# Patient Record
Sex: MALE | Race: WHITE | Employment: OTHER | URBAN - METROPOLITAN AREA
[De-identification: names, ages, dates, MRNs, and addresses within clinical notes are randomized per-mention and may not be internally consistent; named-entity substitution may affect disease eponyms.]

---

## 2023-01-18 ENCOUNTER — HOSPITAL ENCOUNTER (EMERGENCY)
Age: 43
Discharge: HOME | End: 2023-01-18
Payer: COMMERCIAL

## 2023-01-18 VITALS
RESPIRATION RATE: 20 BRPM | SYSTOLIC BLOOD PRESSURE: 150 MMHG | HEART RATE: 95 BPM | OXYGEN SATURATION: 100 % | WEIGHT: 255 LBS | DIASTOLIC BLOOD PRESSURE: 108 MMHG | TEMPERATURE: 98.1 F

## 2023-01-18 DIAGNOSIS — M10.9 ACUTE GOUT OF LEFT HAND, UNSPECIFIED CAUSE: Primary | ICD-10-CM

## 2023-01-18 PROCEDURE — 96372 THER/PROPH/DIAG INJ SC/IM: CPT

## 2023-01-18 PROCEDURE — 6360000002 HC RX W HCPCS: Performed by: NURSE PRACTITIONER

## 2023-01-18 PROCEDURE — 99211 OFF/OP EST MAY X REQ PHY/QHP: CPT

## 2023-01-18 RX ORDER — KETOROLAC TROMETHAMINE 30 MG/ML
30 INJECTION, SOLUTION INTRAMUSCULAR; INTRAVENOUS ONCE
Status: COMPLETED | OUTPATIENT
Start: 2023-01-18 | End: 2023-01-18

## 2023-01-18 RX ORDER — DEXAMETHASONE SODIUM PHOSPHATE 10 MG/ML
10 INJECTION, SOLUTION INTRAMUSCULAR; INTRAVENOUS ONCE
Status: COMPLETED | OUTPATIENT
Start: 2023-01-18 | End: 2023-01-18

## 2023-01-18 RX ORDER — INDOMETHACIN 50 MG/1
50 CAPSULE ORAL
Qty: 20 CAPSULE | Refills: 0 | Status: SHIPPED | OUTPATIENT
Start: 2023-01-18

## 2023-01-18 RX ADMIN — DEXAMETHASONE SODIUM PHOSPHATE 10 MG: 10 INJECTION, SOLUTION INTRAMUSCULAR; INTRAVENOUS at 13:27

## 2023-01-18 RX ADMIN — KETOROLAC TROMETHAMINE 30 MG: 30 INJECTION, SOLUTION INTRAMUSCULAR; INTRAVENOUS at 13:26

## 2023-01-18 ASSESSMENT — PAIN SCALES - GENERAL
PAINLEVEL_OUTOF10: 10
PAINLEVEL_OUTOF10: 10

## 2023-01-18 ASSESSMENT — PAIN - FUNCTIONAL ASSESSMENT: PAIN_FUNCTIONAL_ASSESSMENT: 0-10

## 2023-01-18 NOTE — ED PROVIDER NOTES
[address] ENCOUNTER        Pt Name: Enid Haddad  MRN: [id number]  [place] 1980  Date of evaluation: 1/18/2023  Provider: EVENS Cabrera CNP  PCP: No primary care provider on file. Note Started: 1:19 PM EST 1/18/23    CHIEF COMPLAINT       Chief Complaint   Patient presents with    Gout     Left hand, and foot, started Sunday night       HISTORY OF PRESENT ILLNESS: 1 or more Elements   History From: patient    Limitations to history : None    Enid Haddad is a 43 y.o. male who presents with complaints of gout. He said he has had it before. It is in his left hand and his left foot. It started 4 days ago. He said it is very painful. He said that he has had gout before in his hand and its the same thing that he always gets. He said he is in a lot of pain he said he made his appointment with a rheumatologist but is not until February so he presents here for evaluation. Nursing Notes were all reviewed and agreed with or any disagreements were addressed in the HPI. REVIEW OF SYSTEMS :      Review of Systems    Positives and Pertinent negatives as per HPI. SURGICAL HISTORY     Past Surgical History:   Procedure Laterality Date    ELBOW SURGERY      JOINT REPLACEMENT      WISDOM TOOTH EXTRACTION         CURRENTMEDICATIONS       Previous Medications    No medications on file       ALLERGIES     Patient has no known allergies. FAMILYHISTORY     History reviewed. No pertinent family history.      SOCIAL HISTORY       Social History     Tobacco Use    Smoking status: Never    Smokeless tobacco: Never   Substance Use Topics    Drug use: Never       SCREENINGS                                    PHYSICAL EXAM  1 or more Elements     ED Triage Vitals   BP Temp Temp src Heart Rate Resp SpO2 Height Weight   01/18/23 1233 01/18/23 1233 -- 01/18/23 1233 01/18/23 1233 01/18/23 1233 -- 01/18/23 1231   (!) 150/108 98.1 °F (36.7 °C)  (!) 105 22 100 % 255 lb (115.7 kg)       Physical Exam        Constitutional/General: Alert and oriented x3,   Head: Normocephalic and atraumatic  Eyes:  conjunctiva normal, sclera non icteric  ENT:  , handling secretions  Neck: Supple, full ROM,   Respiratory: . Not in respiratory distress  Cardiovascular:  Regular rate. Musculoskeletal: Moves all extremities x 4. Did look at the left foot where he said he has gout there is no redness and there is no swelling but he does have a tender area on the dorsum of the midfoot. He said that his \"tophi gout \"the left hand is erythematous the index finger and the fifth finger along with some swelling of the hand. He is very tender to touch. Integument: skin warm and dry. No rashes. Neurologic: GCS 15, no focal deficits, symmetric strength 5/5 in the upper and lower extremities bilaterally  Psychiatric: Normal Affect            DIAGNOSTIC RESULTS   LABS:    Labs Reviewed - No data to display    As interpreted by me, the above displayed labs are abnormal. All other labs obtained during this visit were within normal range or not returned as of this dictation. Interpretation per the Radiologist below, if available at the time of this note:    No orders to display     No results found. No results found. PROCEDURES   Unless otherwise noted below, none     Procedures      PAST MEDICAL HISTORY/Chronic Conditions Affecting Care      has a past medical history of Arthritis, Gout, and Hypertension.      EMERGENCY DEPARTMENT COURSE    Vitals:    Vitals:    01/18/23 1231 01/18/23 1233   BP:  (!) 150/108   Pulse:  (!) 105   Resp:  22   Temp:  98.1 °F (36.7 °C)   SpO2:  100%   Weight: 255 lb (115.7 kg)        Patient was given the following medications:  Medications   ketorolac (TORADOL) injection 30 mg (has no administration in time range)   dexamethasone (PF) (DECADRON) injection 10 mg (has no administration in time range)                   Medical Decision Making/Differential Diagnosis:    CC/HPI Summary, Social Determinants of health, Records Reviewed, DDx, testing done/not done, ED Course, Reassessment, disposition considerations/shared decision making with patient, consults, disposition:        I do not see any problems with the left foot but he said he has gout there also --it is not red warm or swollen the left hand the index finger and the fifth finger are erythematous and swollen. The entire dorsum of the hand is swollen but is not erythematous --it is warm to touch. Do not see any open areas or wounds to make me think that the cellulitis. I tried to examine his hand but he is in such pain he would not even hardly let me look at it. He said he has had gout here before and he said that is what it is he has had it before and its the same thing. I did treat him for gout I did give him Toradol IM along with Decadron orally. Tomorrow he can start the Indocin to take that as needed for gout pain and he should follow-up with his specialist as he has already set up. If his hand worsens I advised him to go directly to the emergency department    CONSULTS: (Who and What was discussed)  None        I am the Primary Clinician of Record. FINAL IMPRESSION      1. Acute gout of left hand, unspecified cause          DISPOSITION/PLAN     DISPOSITION Decision To Discharge 01/18/2023 01:16:16 PM      PATIENT REFERRED TO:  call the referral line to get established with a   [phone]  Schedule an appointment as soon as possible for a visit         DISCHARGE MEDICATIONS:  New Prescriptions    INDOMETHACIN (INDOCIN) 50 MG CAPSULE    Take 1 capsule by mouth 3 times daily (with meals) PRN for gout pain.  Start on 1/19       DISCONTINUED MEDICATIONS:  Discontinued Medications    No medications on file              (Please note that portions of this note were completed with a voice recognition program.  Efforts were made to edit the dictations but occasionally words are mis-transcribed.)    EVENS Blanco CNP (electronically signed)          EVENS Blanco CNP  01/18/23 0785